# Patient Record
Sex: FEMALE | Race: WHITE | NOT HISPANIC OR LATINO | URBAN - METROPOLITAN AREA
[De-identification: names, ages, dates, MRNs, and addresses within clinical notes are randomized per-mention and may not be internally consistent; named-entity substitution may affect disease eponyms.]

---

## 2019-02-11 ENCOUNTER — OFFICE VISIT (OUTPATIENT)
Dept: URGENT CARE | Facility: CLINIC | Age: 54
End: 2019-02-11
Payer: COMMERCIAL

## 2019-02-11 VITALS
OXYGEN SATURATION: 98 % | RESPIRATION RATE: 12 BRPM | TEMPERATURE: 99 F | HEART RATE: 53 BPM | HEIGHT: 67 IN | BODY MASS INDEX: 21.66 KG/M2 | WEIGHT: 138 LBS | SYSTOLIC BLOOD PRESSURE: 129 MMHG | DIASTOLIC BLOOD PRESSURE: 74 MMHG

## 2019-02-11 DIAGNOSIS — R10.9 ABDOMINAL CRAMPING: ICD-10-CM

## 2019-02-11 DIAGNOSIS — R11.0 NAUSEA: ICD-10-CM

## 2019-02-11 DIAGNOSIS — K29.00 ACUTE GASTRITIS WITHOUT HEMORRHAGE, UNSPECIFIED GASTRITIS TYPE: Primary | ICD-10-CM

## 2019-02-11 PROCEDURE — 3008F BODY MASS INDEX DOCD: CPT | Mod: CPTII,S$GLB,, | Performed by: NURSE PRACTITIONER

## 2019-02-11 PROCEDURE — 3008F PR BODY MASS INDEX (BMI) DOCUMENTED: ICD-10-PCS | Mod: CPTII,S$GLB,, | Performed by: NURSE PRACTITIONER

## 2019-02-11 PROCEDURE — 99203 PR OFFICE/OUTPT VISIT, NEW, LEVL III, 30-44 MIN: ICD-10-PCS | Mod: S$GLB,,, | Performed by: NURSE PRACTITIONER

## 2019-02-11 PROCEDURE — 99203 OFFICE O/P NEW LOW 30 MIN: CPT | Mod: S$GLB,,, | Performed by: NURSE PRACTITIONER

## 2019-02-11 RX ORDER — DICYCLOMINE HYDROCHLORIDE 20 MG/1
20 TABLET ORAL 3 TIMES DAILY PRN
Qty: 30 TABLET | Refills: 0 | Status: SHIPPED | OUTPATIENT
Start: 2019-02-11 | End: 2020-02-11

## 2019-02-11 RX ORDER — OMEPRAZOLE 20 MG/1
20 CAPSULE, DELAYED RELEASE ORAL DAILY
Qty: 30 CAPSULE | Refills: 11 | Status: SHIPPED | OUTPATIENT
Start: 2019-02-11 | End: 2020-02-11

## 2019-02-11 RX ORDER — LEVOTHYROXINE SODIUM 150 UG/1
150 TABLET ORAL DAILY
Refills: 0 | COMMUNITY
Start: 2018-12-27

## 2019-02-11 RX ORDER — ONDANSETRON 4 MG/1
4 TABLET, ORALLY DISINTEGRATING ORAL EVERY 12 HOURS PRN
Qty: 20 TABLET | Refills: 0 | Status: SHIPPED | OUTPATIENT
Start: 2019-02-11

## 2019-02-12 NOTE — PATIENT INSTRUCTIONS
Gastritis (Adult)    Gastritis is inflammation and irritation of the stomach lining. It can be present for a short time (acute) or be long lasting (chronic). Gastritis is often caused by infection with bacteria called H pylori. More than a third of people in the [place] have this bacteria in their bodies. In many cases, H pylori causes no problems or symptoms. In some people, though, the infection irritates the stomach lining and causes gastritis. Other causes of stomach irritation include drinking alcohol or taking pain-relieving medicines called NSAIDs (such as aspirin or ibuprofen).   Symptoms of gastritis can include:  · Abdominal pain or bloating  · Loss of appetite  · Nausea or vomiting  · Vomiting blood or having black stools  · Feeling more tired than usual  An inflamed and irritated stomach lining is more likely to develop a sore called an ulcer. To help prevent this, gastritis should be treated.  Home care  If needed, medicines may be prescribed. If you have H pylori infection, treating it will likely relieve your symptoms. Other changes can help reduce stomach irritation and help it heal.  · If you have been prescribed medicines for H pylori infection, take them as directed. Take all of the medicine until it is finished or your healthcare provider tells you to stop, even if you feel better.  · Your healthcare provider may recommend avoiding NSAIDs. If you take daily aspirin for your heart or other medical reasons, do not stop without talking to your healthcare provider first.  · Avoid drinking alcohol.  · Stop smoking. Smoking can irritate the stomach and delay healing. As much as possible, stay away from second hand smoke.  Follow-up care  Follow up with your healthcare provider, or as advised by our staff. Testing may be needed to check for inflammation or an ulcer.  When to seek medical advice  Call your healthcare provider for any of the following:  · Stomach pain that gets worse or moves to the lower  right abdomen (appendix area)  · Chest pain that appears or gets worse, or spreads to the back, neck, shoulder, or arm  · Frequent vomiting (cant keep down liquids)  · Blood in the stool or vomit (red or black in color)  · Feeling weak or dizzy  · Fever of 100.4ºF (38ºC) or higher, or as directed by your healthcare provider  Date Last Reviewed: 6/22/2015  © 8243-8152 [employer]. [address] [place] [address]. All rights reserved. This information is not intended as a substitute for professional medical care. Always follow your healthcare professional's instructions.

## 2019-02-12 NOTE — PROGRESS NOTES
Subjective:       Patient ID: Nichole Lopez is a 53 y.o. female.    Vitals:  vitals were not taken for this visit.     Chief Complaint: No chief complaint on file.    HPI  <OUCOOHADULT>    Objective:      Physical Exam    Assessment:       No diagnosis found.    Plan:         There are no diagnoses linked to this encounter.

## 2019-02-12 NOTE — PROGRESS NOTES
"Subjective:       Patient ID: Nichole Lopez is a 53 y.o. female.    Vitals:  height is 5' 6.5" (1.689 m) and weight is 62.6 kg (138 lb). Her temperature is 98.6 °F (37 °C). Her blood pressure is 129/74 and her pulse is 53 (abnormal). Her respiration is 12 and oxygen saturation is 98%.     Chief Complaint: Abdominal Pain    Patient presents abdominal pain that occurred yesterday.  She states the pain started after the marathon race when she went  to go eat.  She had a figueroa cake & kale salad.  Pain subsided and then occurred this morning again when she ate a bagel along with 2 alcohol acidic drink. Not local.  She states that this pain has happened before, and was diagnosed with GERD.     Nausea with one episode of vomiting (no blood pr mucus). No Diarrhea. No fever.  Endorses firm stool.    Admits to Hx of GERD, but no symptoms in some time.  Just ran marathon - possible some dehydration and constipation vs gastritis vs viral syndrome.      Abdominal Pain   This is a new problem. The current episode started yesterday. The onset quality is sudden. The problem occurs intermittently. The problem has been gradually worsening. The pain is located in the generalized abdominal region. The pain is at a severity of 5/10. The pain is moderate. The quality of the pain is burning. The abdominal pain does not radiate. Associated symptoms include belching, nausea and vomiting. Pertinent negatives include no anorexia, arthralgias, constipation, diarrhea, dysuria, fever, flatus, frequency, headaches, hematochezia, hematuria, melena, myalgias or weight loss. The pain is aggravated by eating and drinking alcohol. The pain is relieved by vomiting. She has tried antacids (Maalox - 5 minutes ago) for the symptoms. The treatment provided mild relief. Her past medical history is significant for GERD. There is no history of abdominal surgery, colon cancer, Crohn's disease, gallstones, irritable bowel syndrome, pancreatitis, PUD or " ulcerative colitis. Patient's medical history does not include kidney stones and UTI.       Constitution: Negative for appetite change, chills, sweating and fever.   HENT: Negative for trouble swallowing.    Cardiovascular: Negative for chest pain.   Respiratory: Negative for shortness of breath.    Gastrointestinal: Positive for abdominal pain, nausea and vomiting. Negative for abdominal trauma, abdominal bloating, history of abdominal surgery, constipation, diarrhea, bright red blood in stool, dark colored stools and heartburn.   Genitourinary: Negative for dysuria, frequency, hematuria, missed menses and pelvic pain.   Musculoskeletal: Negative for joint pain, back pain and muscle ache.   Neurological: Negative for headaches.       Objective:      Physical Exam   Constitutional: She is oriented to person, place, and time. She appears well-developed and well-nourished.   HENT:   Head: Normocephalic and atraumatic.   Right Ear: External ear normal.   Left Ear: External ear normal.   Nose: Nose normal.   Mouth/Throat: Mucous membranes are normal.   Eyes: Conjunctivae and lids are normal.   Neck: Trachea normal and full passive range of motion without pain. Neck supple.   Cardiovascular: Normal rate, regular rhythm and normal heart sounds.   Pulmonary/Chest: Effort normal and breath sounds normal. No respiratory distress.   Abdominal: Soft. Normal appearance. She exhibits no distension, no abdominal bruit, no pulsatile midline mass and no mass. Bowel sounds are decreased. There is no hepatosplenomegaly. There is no tenderness. There is no rigidity, no rebound, no guarding, no tenderness at McBurney's point and negative Hassan's sign.       Musculoskeletal: Normal range of motion. She exhibits no edema.   Neurological: She is alert and oriented to person, place, and time. She has normal strength.   Skin: Skin is warm, dry and intact. She is not diaphoretic. No pallor.   Psychiatric: She has a normal mood and affect.  Her speech is normal and behavior is normal. Judgment and thought content normal. Cognition and memory are normal.   Nursing note and vitals reviewed.      Assessment:       1. Acute gastritis without hemorrhage, unspecified gastritis type    2. Abdominal cramping    3. Nausea        Plan:         Acute gastritis without hemorrhage, unspecified gastritis type  -     omeprazole (PRILOSEC) 20 MG capsule; Take 1 capsule (20 mg total) by mouth once daily.  Dispense: 30 capsule; Refill: 11    Abdominal cramping  -     dicyclomine (BENTYL) 20 mg tablet; Take 1 tablet (20 mg total) by mouth 3 (three) times daily as needed.  Dispense: 30 tablet; Refill: 0    Nausea  -     ondansetron (ZOFRAN-ODT) 4 MG TbDL; Take 1 tablet (4 mg total) by mouth every 12 (twelve) hours as needed.  Dispense: 20 tablet; Refill: 0      Patient Instructions     Gastritis (Adult)    Gastritis is inflammation and irritation of the stomach lining. It can be present for a short time (acute) or be long lasting (chronic). Gastritis is often caused by infection with bacteria called H pylori. More than a third of people in the  have this bacteria in their bodies. In many cases, H pylori causes no problems or symptoms. In some people, though, the infection irritates the stomach lining and causes gastritis. Other causes of stomach irritation include drinking alcohol or taking pain-relieving medicines called NSAIDs (such as aspirin or ibuprofen).   Symptoms of gastritis can include:  · Abdominal pain or bloating  · Loss of appetite  · Nausea or vomiting  · Vomiting blood or having black stools  · Feeling more tired than usual  An inflamed and irritated stomach lining is more likely to develop a sore called an ulcer. To help prevent this, gastritis should be treated.  Home care  If needed, medicines may be prescribed. If you have H pylori infection, treating it will likely relieve your symptoms. Other changes can help reduce stomach irritation and help it  heal.  · If you have been prescribed medicines for H pylori infection, take them as directed. Take all of the medicine until it is finished or your healthcare provider tells you to stop, even if you feel better.  · Your healthcare provider may recommend avoiding NSAIDs. If you take daily aspirin for your heart or other medical reasons, do not stop without talking to your healthcare provider first.  · Avoid drinking alcohol.  · Stop smoking. Smoking can irritate the stomach and delay healing. As much as possible, stay away from second hand smoke.  Follow-up care  Follow up with your healthcare provider, or as advised by our staff. Testing may be needed to check for inflammation or an ulcer.  When to seek medical advice  Call your healthcare provider for any of the following:  · Stomach pain that gets worse or moves to the lower right abdomen (appendix area)  · Chest pain that appears or gets worse, or spreads to the back, neck, shoulder, or arm  · Frequent vomiting (cant keep down liquids)  · Blood in the stool or vomit (red or black in color)  · Feeling weak or dizzy  · Fever of 100.4ºF (38ºC) or higher, or as directed by your healthcare provider  Date Last Reviewed: 6/22/2015  © 9699-7117 [employer]. [address] [place] [address], [place] [address]. All rights reserved. This information is not intended as a substitute for professional medical care. Always follow your healthcare professional's instructions.

## 2019-02-14 ENCOUNTER — TELEPHONE (OUTPATIENT)
Dept: URGENT CARE | Facility: CLINIC | Age: 54
End: 2019-02-14

## 2024-07-12 ENCOUNTER — OFFICE (OUTPATIENT)
Dept: URBAN - METROPOLITAN AREA CLINIC 51 | Facility: CLINIC | Age: 59
End: 2024-07-12

## 2024-07-12 VITALS — WEIGHT: 128 LBS | HEIGHT: 66 IN

## 2024-07-12 DIAGNOSIS — R11.0 NAUSEA: ICD-10-CM

## 2024-07-12 DIAGNOSIS — K30 FUNCTIONAL DYSPEPSIA: ICD-10-CM

## 2024-07-12 DIAGNOSIS — R14.0: ICD-10-CM

## 2024-07-12 DIAGNOSIS — R10.9 ABDOMINAL PAIN: ICD-10-CM

## 2024-07-12 PROCEDURE — 99204 OFFICE O/P NEW MOD 45 MIN: CPT | Mod: 95 | Performed by: SPECIALIST

## 2024-07-12 PROCEDURE — G0406 INPT/TELE FOLLOW UP 15: HCPCS | Performed by: SPECIALIST

## 2024-07-12 NOTE — SERVICEHPINOTES
Kacie White is a 58-year-old female patient presenting today for an evaluation via telemedicine for abdominal pain. The patient has abdominal pain. She states that she has a c/o burning in the stomach. She is stressed. She has burning in the throat and heartburn. She states that her side aches while driving. She states that she has nausea, bloating, and belching. She has fullness. She has stopped taking dairy and her sxs are intermittent. She has tried Pepcid AC. She has a hx of lap cholecystectomy. Her last colonoscopy and EGD was 5 years ago. The consistency of the stools is normal and unchanged. Fecal urgency is not reported. The patient denies straining for a bowel movement and denies rectal bleeding. The patient does not experience vomiting. There is no history of losing weight abnormally and the patient’s weight has been stable. There is no history of hemorrhoids in any respect. No [place] of relatives has records of colon polyps or colon cancers. The patient is not on any blood thinners. Denies any problems with chest pain, exertional dyspnea, or ongoing or uncontrolled respiratory or cardiovascular conditions.br visited="true"

## 2024-07-12 NOTE — SERVICENOTES
The patient and family/friends if present were apprised of the use of a virtual scribe through remote viewing and/or audio and all parties consented to conduct the visit in this manner. Documentation assistance was provided by Praveen Cabrera.